# Patient Record
Sex: MALE | Race: BLACK OR AFRICAN AMERICAN | NOT HISPANIC OR LATINO | Employment: OTHER | URBAN - METROPOLITAN AREA
[De-identification: names, ages, dates, MRNs, and addresses within clinical notes are randomized per-mention and may not be internally consistent; named-entity substitution may affect disease eponyms.]

---

## 2023-05-02 ENCOUNTER — HOSPITAL ENCOUNTER (EMERGENCY)
Facility: CLINIC | Age: 43
Discharge: HOME | End: 2023-05-02
Attending: EMERGENCY MEDICINE | Admitting: EMERGENCY MEDICINE
Payer: COMMERCIAL

## 2023-05-02 VITALS
HEART RATE: 79 BPM | RESPIRATION RATE: 16 BRPM | SYSTOLIC BLOOD PRESSURE: 130 MMHG | WEIGHT: 148.59 LBS | OXYGEN SATURATION: 100 % | DIASTOLIC BLOOD PRESSURE: 87 MMHG | TEMPERATURE: 98.3 F

## 2023-05-02 DIAGNOSIS — R07.89 ATYPICAL CHEST PAIN: ICD-10-CM

## 2023-05-02 LAB
ANION GAP SERPL CALCULATED.3IONS-SCNC: 17 MMOL/L (ref 7–15)
BASOPHILS # BLD AUTO: 0 10E3/UL (ref 0–0.2)
BASOPHILS NFR BLD AUTO: 0 %
BUN SERPL-MCNC: 9.4 MG/DL (ref 6–20)
CALCIUM SERPL-MCNC: 9.4 MG/DL (ref 8.6–10)
CHLORIDE SERPL-SCNC: 98 MMOL/L (ref 98–107)
CK SERPL-CCNC: 216 U/L (ref 39–308)
CREAT SERPL-MCNC: 0.75 MG/DL (ref 0.67–1.17)
DEPRECATED HCO3 PLAS-SCNC: 20 MMOL/L (ref 22–29)
EOSINOPHIL # BLD AUTO: 0.1 10E3/UL (ref 0–0.7)
EOSINOPHIL NFR BLD AUTO: 1 %
ERYTHROCYTE [DISTWIDTH] IN BLOOD BY AUTOMATED COUNT: 12.6 % (ref 10–15)
GFR SERPL CREATININE-BSD FRML MDRD: >90 ML/MIN/1.73M2
GLUCOSE SERPL-MCNC: 81 MG/DL (ref 70–99)
HCT VFR BLD AUTO: 44.8 % (ref 40–53)
HGB BLD-MCNC: 15.6 G/DL (ref 13.3–17.7)
HOLD SPECIMEN: NORMAL
HOLD SPECIMEN: NORMAL
IMM GRANULOCYTES # BLD: 0 10E3/UL
IMM GRANULOCYTES NFR BLD: 0 %
LYMPHOCYTES # BLD AUTO: 1.5 10E3/UL (ref 0.8–5.3)
LYMPHOCYTES NFR BLD AUTO: 33 %
MCH RBC QN AUTO: 29 PG (ref 26.5–33)
MCHC RBC AUTO-ENTMCNC: 34.8 G/DL (ref 31.5–36.5)
MCV RBC AUTO: 83 FL (ref 78–100)
MONOCYTES # BLD AUTO: 0.4 10E3/UL (ref 0–1.3)
MONOCYTES NFR BLD AUTO: 9 %
NEUTROPHILS # BLD AUTO: 2.6 10E3/UL (ref 1.6–8.3)
NEUTROPHILS NFR BLD AUTO: 57 %
NRBC # BLD AUTO: 0 10E3/UL
NRBC BLD AUTO-RTO: 0 /100
PLATELET # BLD AUTO: 152 10E3/UL (ref 150–450)
POTASSIUM SERPL-SCNC: 3.9 MMOL/L (ref 3.4–5.3)
RBC # BLD AUTO: 5.38 10E6/UL (ref 4.4–5.9)
SODIUM SERPL-SCNC: 135 MMOL/L (ref 136–145)
TROPONIN T SERPL HS-MCNC: 6 NG/L
WBC # BLD AUTO: 4.6 10E3/UL (ref 4–11)

## 2023-05-02 PROCEDURE — 99285 EMERGENCY DEPT VISIT HI MDM: CPT | Mod: 25

## 2023-05-02 PROCEDURE — 80048 BASIC METABOLIC PNL TOTAL CA: CPT | Performed by: EMERGENCY MEDICINE

## 2023-05-02 PROCEDURE — 85004 AUTOMATED DIFF WBC COUNT: CPT | Performed by: EMERGENCY MEDICINE

## 2023-05-02 PROCEDURE — 93005 ELECTROCARDIOGRAM TRACING: CPT

## 2023-05-02 PROCEDURE — 84484 ASSAY OF TROPONIN QUANT: CPT | Performed by: EMERGENCY MEDICINE

## 2023-05-02 PROCEDURE — 82550 ASSAY OF CK (CPK): CPT | Performed by: EMERGENCY MEDICINE

## 2023-05-02 PROCEDURE — 36415 COLL VENOUS BLD VENIPUNCTURE: CPT | Performed by: EMERGENCY MEDICINE

## 2023-05-02 ASSESSMENT — ACTIVITIES OF DAILY LIVING (ADL): ADLS_ACUITY_SCORE: 33

## 2023-05-02 NOTE — ED PROVIDER NOTES
History     Chief Complaint:  Chest Wall Pain       HPI History limited by language barrier:  utilized    Mary Kate Joseph is a 42 year old male with a history of diabetes who presents with chest wall pain. The patient reports that he has left sided chest wall pain after doing lifting exercises last week. The patient denies any cardiac history. He states since pain onset last week he his pain has been getting better but slowly. The patient reports the pain onset was 2 days after doing the arm workout. He denies reproducible pain with movement or palpation to left arm, shoulder, and chest.     Independent Historian:   None - Patient Only    Review of External Notes:      ROS:  Review of Systems   ROS: ROS neg other than the symptoms noted above in the HPI.    Allergies:  No Known Allergies     Medications:    Invokamet  Crestor     Past Medical History:    Diabetes     Social History:  The patient presents with his wife.   PCP: Nory Jackman MD         Physical Exam     Patient Vitals for the past 24 hrs:   BP Temp Temp src Pulse Resp SpO2 Weight   05/02/23 1331 130/87 98.3  F (36.8  C) Temporal 79 16 100 % 67.4 kg (148 lb 9.4 oz)        Physical Exam  Gen: well appearing, in no acute distress  HENT:  mmm, no rhinorrhea  Eyes: periorbital tissues and sclera normal   Neck: supple, no abnormal swelling  Lungs:  CTAB,  no resp distress  CV: rrr, no m/r/g, ppi  Abd: soft, nontender, nondistended, no rebound/masses/guarding/hsm  Ext: no peripheral edema, + reproducible ttp L pectoralis muscle  Skin: warm, dry, well perfused, no rashes/bruising/lesions on exposed skin  Neuro: alert, MAEE, no gross motor or sensory deficits, gait stable  Psych: Normal mood, normal affect      Emergency Department Course   ECG  ECG taken at 1341, ECG read at 1504  Normal sinus rhythm   Right superior axis deviation   Rate 76 bpm. AR interval 168 ms. QRS duration 102 ms. QT/QTc 390/438 ms. P-R-T axes 55 237 40.      Laboratory:  Labs Ordered and Resulted from Time of ED Arrival to Time of ED Departure   BASIC METABOLIC PANEL - Abnormal       Result Value    Sodium 135 (*)     Potassium 3.9      Chloride 98      Carbon Dioxide (CO2) 20 (*)     Anion Gap 17 (*)     Urea Nitrogen 9.4      Creatinine 0.75      Calcium 9.4      Glucose 81      GFR Estimate >90     TROPONIN T, HIGH SENSITIVITY - Normal    Troponin T, High Sensitivity 6     CK TOTAL - Normal         CBC WITH PLATELETS AND DIFFERENTIAL    WBC Count 4.6      RBC Count 5.38      Hemoglobin 15.6      Hematocrit 44.8      MCV 83      MCH 29.0      MCHC 34.8      RDW 12.6      Platelet Count 152      % Neutrophils 57      % Lymphocytes 33      % Monocytes 9      % Eosinophils 1      % Basophils 0      % Immature Granulocytes 0      NRBCs per 100 WBC 0      Absolute Neutrophils 2.6      Absolute Lymphocytes 1.5      Absolute Monocytes 0.4      Absolute Eosinophils 0.1      Absolute Basophils 0.0      Absolute Immature Granulocytes 0.0      Absolute NRBCs 0.0          Emergency Department Course & Assessments:    Assessments:  1455 I obtained history and examined the patient as noted above.    1606 I returned to check on patient. We discussed findings and plan of care.     Independent Interpretation (X-rays, CTs, rhythm strip):  None    Consultations/Discussion of Management or Tests:  None     Social Determinants of Health affecting care:   None    Disposition:  The patient was discharged to home.     Impression & Plan    CMS Diagnoses: None    Medical Decision Makin y M otherwise healthy with CP after liting weights.  Pain is improving and reproducible on exam here.  PERC negative, lungs clear, no resp tract infectious sx, ecg no e/o ischemia or arrhythmia, trop normal, ck with no e/o rhabdo.  Likely msk, DC home.        Diagnosis:    ICD-10-CM    1. Atypical chest pain  R07.89            Scribe Disclosure:  Tiffanie MATHEWS, am serving as a scribe at 3:04  PM on 5/2/2023 to document services personally performed by Chucho Mayo MD based on my observations and the provider's statements to me.   5/2/2023   No att. providers found        Chucho Richard MD  05/03/23 1203

## 2023-05-03 LAB
ATRIAL RATE - MUSE: 76 BPM
DIASTOLIC BLOOD PRESSURE - MUSE: NORMAL MMHG
INTERPRETATION ECG - MUSE: NORMAL
P AXIS - MUSE: 55 DEGREES
PR INTERVAL - MUSE: 168 MS
QRS DURATION - MUSE: 102 MS
QT - MUSE: 390 MS
QTC - MUSE: 438 MS
R AXIS - MUSE: 237 DEGREES
SYSTOLIC BLOOD PRESSURE - MUSE: NORMAL MMHG
T AXIS - MUSE: 40 DEGREES
VENTRICULAR RATE- MUSE: 76 BPM